# Patient Record
(demographics unavailable — no encounter records)

---

## 2017-05-07 NOTE — EMERGENCY DEPARTMENT REPORT
HPI





- General


Chief Complaint: Dyspnea/Respdistress


Time Seen by Provider: 05/07/17 22:46





- HPI


HPI: 


16-year-old Afro-American female presents the emergency department with her 

mother with the complaint of needing an albuterol inhaler.  She does not 

complain of any asthma-like symptoms at this current time but she allegedly had 

an asthma attack last Monday.  She does not have an albuterol inhaler but once 

she got home she has an albuterol nebulizer that she was able to used to treat 

herself.  She no longer complains of any shortness of breath, cough, wheezing 

or any other physical complaints at this time.  However the patient has not 

been allowed to return to school without an albuterol inhaler and therefore she 

presents today in hopes of getting one.  She does not currently have a primary 

care doctor here as they just moved from [place].  She is up-to-date with 

vaccinations.








ED Past Medical Hx





- Past Medical History


Previous Medical History?: Yes


Hx Asthma: Yes





- Surgical History


Past Surgical History?: No





- Social History


Smoking Status: Never Smoker


Substance Use Type: None





- Medications


Home Medications: 


 Home Medications











 Medication  Instructions  Recorded  Confirmed  Last Taken  Type


 


Ibuprofen [Motrin 600 MG tab] 600 mg PO Q8H PRN #30 tablet 10/15/16  Unknown Rx


 


ALBUTEROL Inhaler [ProAir HFA 2 puff IH QID PRN #1 inhalation 05/07/17  Unknown 

Rx





Inhaler]     














ED Review of Systems


ROS: 


Stated complaint: ASTHMA/HEADACHE


Other details as noted in HPI





Comment: All other systems reviewed and negative


Constitutional: denies: chills, fever


Eyes: denies: eye pain, eye discharge, vision change


ENT: denies: ear pain, throat pain


Respiratory: denies: cough, shortness of breath, wheezing


Cardiovascular: denies: chest pain, palpitations


Gastrointestinal: denies: abdominal pain, nausea, diarrhea


Genitourinary: denies: urgency, dysuria, discharge


Musculoskeletal: denies: back pain, joint swelling, arthralgia


Skin: denies: rash, lesions


Neurological: denies: headache, weakness, paresthesias





Physical Exam





- Physical Exam


Vital Signs: 


 Vital Signs











  05/07/17





  18:33


 


Temperature 98.8 F


 


Pulse Rate 87


 


Respiratory 16





Rate 


 


Blood Pressure 126/85


 


O2 Sat by Pulse 100





Oximetry 











Physical Exam: 


GENERAL: The patient is well-developed well-nourished.


HEENT: Normocephalic.  Atraumatic.  Extraocular motions are intact.  Patient 

has moist mucous membranes.


NECK: Supple.  Trachea is midline.


CHEST/LUNGS: Clear to auscultation.  There is no respiratory distress noted.


HEART/CARDIOVASCULAR: Regular.  There is no tachycardia.  There is no gallop 

rub or murmur.


ABDOMEN: Abdomen is soft, nontender.  Patient has normal bowel sounds.  There 

is no abdominal distention.


SKIN: Skin is warm and dry.


NEURO: The patient is awake, alert, and oriented.  The patient is cooperative.  

The patient has no focal neurologic deficits.  The patient has normal speech.


MUSCULOSKELETAL: There is no tenderness or deformity.  There is no limitation 

range of motion.  There is no evidence of acute injury.








ED Course


 Vital Signs











  05/07/17





  18:33


 


Temperature 98.8 F


 


Pulse Rate 87


 


Respiratory 16





Rate 


 


Blood Pressure 126/85


 


O2 Sat by Pulse 100





Oximetry 














ED Medical Decision Making





- Medical Decision Making


16-year-old presents only to get an albuterol inhaler so she can return to 

school.  She does not currently have any of the asthma-like symptoms that 

caused problems for her last Monday, a week ago.  She comes to the emergency 

department as she does not currently have a primary care physician.  Mom says 

that they can easily find one an hour working on it.  She does not require any 

labs, treatments at this time but will give a prescription for an albuterol 

inhaler.








- Differential Diagnosis


asthma, bronchitis, pneumonia


Critical Care Time: No


Critical care attestation.: 


If time is entered above; I have spent that time in minutes in the direct care 

of this critically ill patient, excluding procedure time.








ED Disposition


Clinical Impression: 


 History of asthma





Disposition: DISCHARGED TO HOME OR SELFCARE


Is pt being admited?: No


Condition: Good


Instructions:  Asthma (ED)


Prescriptions: 


ALBUTEROL Inhaler [ProAir HFA Inhaler] 2 puff IH QID PRN #1 inhalation


 PRN Reason: Shortness Of Breath


Referrals: 


PRIMARY CARE,MD [Primary Care Provider] - 3-5 Days


Time of Disposition: 23:06